# Patient Record
Sex: MALE | Race: WHITE | Employment: FULL TIME | URBAN - NONMETROPOLITAN AREA
[De-identification: names, ages, dates, MRNs, and addresses within clinical notes are randomized per-mention and may not be internally consistent; named-entity substitution may affect disease eponyms.]

---

## 2022-05-11 ENCOUNTER — APPOINTMENT (OUTPATIENT)
Dept: CT IMAGING | Age: 62
DRG: 065 | End: 2022-05-11
Attending: INTERNAL MEDICINE

## 2022-05-11 ENCOUNTER — HOSPITAL ENCOUNTER (INPATIENT)
Age: 62
LOS: 2 days | Discharge: HOME | DRG: 065 | End: 2022-05-13
Attending: INTERNAL MEDICINE | Admitting: INTERNAL MEDICINE

## 2022-05-11 ENCOUNTER — APPOINTMENT (OUTPATIENT)
Dept: CT IMAGING | Age: 62
DRG: 065 | End: 2022-05-11
Attending: NURSE PRACTITIONER

## 2022-05-11 ENCOUNTER — APPOINTMENT (OUTPATIENT)
Dept: GENERAL RADIOLOGY | Age: 62
DRG: 065 | End: 2022-05-11
Attending: INTERNAL MEDICINE

## 2022-05-11 DIAGNOSIS — I63.9 CEREBROVASCULAR ACCIDENT (CVA), UNSPECIFIED MECHANISM (HCC): Primary | ICD-10-CM

## 2022-05-11 PROBLEM — I10 HYPERTENSION: Status: ACTIVE | Noted: 2022-05-11

## 2022-05-11 PROBLEM — Z72.0 TOBACCO ABUSE: Status: ACTIVE | Noted: 2022-05-11

## 2022-05-11 LAB
ALBUMIN SERPL-MCNC: 3.7 G/DL (ref 3.4–5)
ALBUMIN/GLOB SERPL: 0.9 {RATIO} (ref 0.8–1.7)
ALP SERPL-CCNC: 82 U/L (ref 45–117)
ALT SERPL-CCNC: 23 U/L (ref 16–61)
ANION GAP SERPL CALC-SCNC: 3 MMOL/L (ref 3–18)
AST SERPL W P-5'-P-CCNC: 16 U/L (ref 10–38)
ATRIAL RATE: 87 BPM
BASOPHILS # BLD: 0.1 K/UL (ref 0–0.1)
BASOPHILS NFR BLD: 1 % (ref 0–2)
BILIRUB DIRECT SERPL-MCNC: <0.1 MG/DL (ref 0–0.2)
BILIRUB SERPL-MCNC: 0.3 MG/DL (ref 0.2–1)
BUN SERPL-MCNC: 14 MG/DL (ref 7–18)
BUN/CREAT SERPL: 17 (ref 12–20)
CA-I BLD-MCNC: 9.2 MG/DL (ref 8.5–10.1)
CALCULATED P AXIS, ECG09: 57 DEGREES
CALCULATED R AXIS, ECG10: -14 DEGREES
CALCULATED T AXIS, ECG11: 131 DEGREES
CHLORIDE SERPL-SCNC: 104 MMOL/L (ref 100–111)
CO2 SERPL-SCNC: 30 MMOL/L (ref 21–32)
CREAT SERPL-MCNC: 0.82 MG/DL (ref 0.6–1.3)
DIAGNOSIS, 93000: NORMAL
DIFFERENTIAL METHOD BLD: ABNORMAL
EOSINOPHIL # BLD: 0.2 K/UL (ref 0–0.4)
EOSINOPHIL NFR BLD: 3 % (ref 0–5)
ERYTHROCYTE [DISTWIDTH] IN BLOOD BY AUTOMATED COUNT: 13.8 % (ref 11.6–14.5)
GLOBULIN SER CALC-MCNC: 4.1 G/DL (ref 2–4)
GLUCOSE SERPL-MCNC: 94 MG/DL (ref 74–99)
HCT VFR BLD AUTO: 50.2 % (ref 36–48)
HGB BLD-MCNC: 16.2 G/DL (ref 13–16)
IMM GRANULOCYTES # BLD AUTO: 0 K/UL (ref 0–0.04)
IMM GRANULOCYTES NFR BLD AUTO: 0 % (ref 0–0.5)
LYMPHOCYTES # BLD: 2.4 K/UL (ref 0.9–3.6)
LYMPHOCYTES NFR BLD: 37 % (ref 21–52)
MCH RBC QN AUTO: 29 PG (ref 24–34)
MCHC RBC AUTO-ENTMCNC: 32.3 G/DL (ref 31–37)
MCV RBC AUTO: 89.8 FL (ref 78–100)
MONOCYTES # BLD: 0.4 K/UL (ref 0.05–1.2)
MONOCYTES NFR BLD: 7 % (ref 3–10)
NEUTS SEG # BLD: 3.3 K/UL (ref 1.8–8)
NEUTS SEG NFR BLD: 52 % (ref 40–73)
NRBC # BLD: 0 K/UL (ref 0–0.01)
NRBC BLD-RTO: 0 PER 100 WBC
P-R INTERVAL, ECG05: 154 MS
PLATELET # BLD AUTO: 203 K/UL (ref 135–420)
PMV BLD AUTO: 11.2 FL (ref 9.2–11.8)
POTASSIUM SERPL-SCNC: 3.8 MMOL/L (ref 3.5–5.5)
PROT SERPL-MCNC: 7.8 G/DL (ref 6.4–8.2)
Q-T INTERVAL, ECG07: 382 MS
QRS DURATION, ECG06: 89 MS
QTC CALCULATION (BEZET), ECG08: 463 MS
RBC # BLD AUTO: 5.59 M/UL (ref 4.35–5.65)
SODIUM SERPL-SCNC: 137 MMOL/L (ref 136–145)
TROPONIN-HIGH SENSITIVITY: 34 NG/L (ref 0–78)
VENTRICULAR RATE, ECG03: 88 BPM
WBC # BLD AUTO: 6.4 K/UL (ref 4.6–13.2)

## 2022-05-11 PROCEDURE — 85025 COMPLETE CBC W/AUTO DIFF WBC: CPT

## 2022-05-11 PROCEDURE — 93005 ELECTROCARDIOGRAM TRACING: CPT

## 2022-05-11 PROCEDURE — 74011250636 HC RX REV CODE- 250/636: Performed by: INTERNAL MEDICINE

## 2022-05-11 PROCEDURE — 84443 ASSAY THYROID STIM HORMONE: CPT

## 2022-05-11 PROCEDURE — 80048 BASIC METABOLIC PNL TOTAL CA: CPT

## 2022-05-11 PROCEDURE — 80076 HEPATIC FUNCTION PANEL: CPT

## 2022-05-11 PROCEDURE — 70496 CT ANGIOGRAPHY HEAD: CPT

## 2022-05-11 PROCEDURE — 70450 CT HEAD/BRAIN W/O DYE: CPT

## 2022-05-11 PROCEDURE — 84484 ASSAY OF TROPONIN QUANT: CPT

## 2022-05-11 PROCEDURE — 74011250637 HC RX REV CODE- 250/637: Performed by: INTERNAL MEDICINE

## 2022-05-11 PROCEDURE — 71045 X-RAY EXAM CHEST 1 VIEW: CPT

## 2022-05-11 PROCEDURE — 74011250636 HC RX REV CODE- 250/636: Performed by: NURSE PRACTITIONER

## 2022-05-11 PROCEDURE — 65270000029 HC RM PRIVATE

## 2022-05-11 PROCEDURE — 74011250637 HC RX REV CODE- 250/637: Performed by: NURSE PRACTITIONER

## 2022-05-11 PROCEDURE — 74011000636 HC RX REV CODE- 636: Performed by: INTERNAL MEDICINE

## 2022-05-11 PROCEDURE — 96374 THER/PROPH/DIAG INJ IV PUSH: CPT

## 2022-05-11 PROCEDURE — 99285 EMERGENCY DEPT VISIT HI MDM: CPT

## 2022-05-11 RX ORDER — IBUPROFEN 200 MG
1 TABLET ORAL DAILY
Status: DISCONTINUED | OUTPATIENT
Start: 2022-05-12 | End: 2022-05-13 | Stop reason: HOSPADM

## 2022-05-11 RX ORDER — LABETALOL HCL 20 MG/4 ML
10 SYRINGE (ML) INTRAVENOUS
Status: DISCONTINUED | OUTPATIENT
Start: 2022-05-11 | End: 2022-05-13 | Stop reason: HOSPADM

## 2022-05-11 RX ORDER — LABETALOL HCL 20 MG/4 ML
20 SYRINGE (ML) INTRAVENOUS ONCE
Status: COMPLETED | OUTPATIENT
Start: 2022-05-11 | End: 2022-05-11

## 2022-05-11 RX ORDER — ATORVASTATIN CALCIUM 40 MG/1
80 TABLET, FILM COATED ORAL
Status: DISCONTINUED | OUTPATIENT
Start: 2022-05-11 | End: 2022-05-13 | Stop reason: HOSPADM

## 2022-05-11 RX ORDER — ENOXAPARIN SODIUM 100 MG/ML
40 INJECTION SUBCUTANEOUS EVERY 24 HOURS
Status: DISCONTINUED | OUTPATIENT
Start: 2022-05-11 | End: 2022-05-11

## 2022-05-11 RX ORDER — LABETALOL HCL 20 MG/4 ML
5 SYRINGE (ML) INTRAVENOUS
Status: DISCONTINUED | OUTPATIENT
Start: 2022-05-11 | End: 2022-05-11

## 2022-05-11 RX ORDER — HYDRALAZINE HYDROCHLORIDE 20 MG/ML
10 INJECTION INTRAMUSCULAR; INTRAVENOUS
Status: DISCONTINUED | OUTPATIENT
Start: 2022-05-11 | End: 2022-05-11

## 2022-05-11 RX ORDER — ACETAMINOPHEN 325 MG/1
650 TABLET ORAL
Status: DISCONTINUED | OUTPATIENT
Start: 2022-05-11 | End: 2022-05-13 | Stop reason: HOSPADM

## 2022-05-11 RX ORDER — ENOXAPARIN SODIUM 100 MG/ML
40 INJECTION SUBCUTANEOUS EVERY 24 HOURS
Status: DISCONTINUED | OUTPATIENT
Start: 2022-05-12 | End: 2022-05-13 | Stop reason: HOSPADM

## 2022-05-11 RX ORDER — ONDANSETRON 4 MG/1
4 TABLET, ORALLY DISINTEGRATING ORAL
Status: DISCONTINUED | OUTPATIENT
Start: 2022-05-11 | End: 2022-05-13 | Stop reason: HOSPADM

## 2022-05-11 RX ORDER — ASPIRIN 325 MG
325 TABLET ORAL
Status: COMPLETED | OUTPATIENT
Start: 2022-05-11 | End: 2022-05-11

## 2022-05-11 RX ORDER — HYDRALAZINE HYDROCHLORIDE 20 MG/ML
10 INJECTION INTRAMUSCULAR; INTRAVENOUS
Status: DISCONTINUED | OUTPATIENT
Start: 2022-05-11 | End: 2022-05-13 | Stop reason: HOSPADM

## 2022-05-11 RX ORDER — GUAIFENESIN 100 MG/5ML
81 LIQUID (ML) ORAL DAILY
Status: DISCONTINUED | OUTPATIENT
Start: 2022-05-12 | End: 2022-05-13 | Stop reason: HOSPADM

## 2022-05-11 RX ADMIN — ATORVASTATIN CALCIUM 80 MG: 40 TABLET, FILM COATED ORAL at 21:01

## 2022-05-11 RX ADMIN — ASPIRIN 325 MG ORAL TABLET 325 MG: 325 PILL ORAL at 15:55

## 2022-05-11 RX ADMIN — LABETALOL HYDROCHLORIDE 10 MG: 5 INJECTION, SOLUTION INTRAVENOUS at 15:56

## 2022-05-11 RX ADMIN — HYDRALAZINE HYDROCHLORIDE 10 MG: 20 INJECTION INTRAMUSCULAR; INTRAVENOUS at 22:56

## 2022-05-11 RX ADMIN — IOPAMIDOL 100 ML: 755 INJECTION, SOLUTION INTRAVENOUS at 17:19

## 2022-05-11 NOTE — ED NOTES
TRANSFER - OUT REPORT:    Verbal report given to Kayla Nichols LPN(name) on Erwin Butcher  being transferred to [address] [place] [address](unit) for routine progression of care       Report consisted of patients Situation, Background, Assessment and   Recommendations(SBAR). Information from the following report(s) SBAR, ED Summary, MAR, Recent Results and Cardiac Rhythm NSR was reviewed with the receiving nurse. Lines:   Peripheral IV 05/11/22 Right Antecubital (Active)        Opportunity for questions and clarification was provided.       Patient transported with:   Monitor  Registered Nurse

## 2022-05-11 NOTE — PROGRESS NOTES
1900- Assumed care of pt. Family at bedside. Pt alert and oriented. No other needs at this time. 2105- Scheduled medications given per MAR. Pt tolerated well. Pt resting quietly watching tv. 2258- Vitals obtained. Gave pt hydralazine via IV for BP. MD aware. 0109- BP down slightly. Will continue to monitor. 0125- Scheduled medication given per MAR. Pt tolerated well. 0315- Pt rounded. Vitals obtained. BP improved with hydralazine.

## 2022-05-11 NOTE — ED TRIAGE NOTES
Pt. States he was at work yesterday morning around 1100 and he noticed left sided arm and leg weakness and slurred speech. Pt kept on working and got home at 0. Pt. Agreed to come to the ER this afternoon.

## 2022-05-11 NOTE — ASSESSMENT & PLAN NOTE
-symptoms: left side weakness and slurred speech  -CT head: No acute intracranial abnormality. Subcortical and periventricular white matter low-attenuation suggesting chronic ischemic microvascular change along with stigmata of several prior areas of infarction.   -CTA head/neck ordered  -MRI ordered  -allow permissive hypertension, treat with labetalol for systolic greater than 179/593 and Hydralazine for systolic greater than 028  -ASA 81 mg, Lipitor 80 mg started  -EKG: SR with LVH  -Neuro checks every 4 hours  -ECHO in am  -lipid panel in am  -Neuro consulted in the ED  -PT/OT evaluation

## 2022-05-11 NOTE — H&P
History and Physical    Subjective:     Andreina Hitchcock is a 63 y.o. with a past medical history for hypertension, coronary artery disease with a LAD stent, dyslipidemia, and tobacco abuse, patient presented to the ED with a chief complaint of left-sided weakness and slurred speech. Patient reports that the symptoms started yesterday afternoon around 1, and continued into today, at that time wife noticed that his symptoms of slurred speech had worsened, and encourage patient to come to the hospital. Other accompanying symptoms included ataxia and left-sided facial droop, patient denies lightheadedness, confusion, numbness and tingling, chest pain, palpitations, shortness of breath, and abdominal distress. In the ED H&H 16.2/50.2, BMP unremarkable, CT of the head showed no acute intracranial abnormalities, chest x-ray showed no acute findings, EKG shows sinus rhythm with probable left atrial enlargement. While in the ED patient received 325 mg of oral aspirin and 10 mg of IV labetalol for systolic blood pressure greater than 220. Admit to telemetry for CVA workup. Patient assessed in the ED, at the bedside, patient is alert and oriented, there is no acute distress noted. Patient agrees to admission for a diagnosis of CVA, treatment to include CTA pending, MRI ordered, echo, neurological checks every 4 hours, and telemetry neurologist consult in the ED. Past Medical History:   Diagnosis Date    Hypertension       Past Surgical History:   Procedure Laterality Date    HX HEART CATHETERIZATION      x1 stent     History reviewed. No pertinent family history.    Social History     Tobacco Use    Smoking status: Current Every Day Smoker     Packs/day: 1.00    Smokeless tobacco: Never Used   Substance Use Topics    Alcohol use: Not on file     Comment: occasionally       Prior to Admission medications    Not on File     No Known Allergies       REVIEW OF SYSTEMS:       Total of 12 systems reviewed as follows: Positive = Red  Constitutional: Negative for malaise/fatigue and weakness, negative for fever and chills   HENT: Negative for ear pain, headaches, negative for loss of sense of taste and smell   Eyes: Negative for blurred vision and double vision   Skin: Negative for itching, negative for open areas   Cardiovascular: Negative for chest pain, palpitations, negative for swelling   Respiratory: Negative for shortness or breath, negative for cough, negative for sputum production   Gastrointestinal: Negative for abdominal pain, constipation, nausea, vomiting, and diarrhea   Genitourinary: Negative for dysuria, frequency, and hematuria   Musculoskeletal: Negative for joint pain and myalgias   Neurological: Positive for left side weakness, left side facial droop, slurred speech, and balance issues. Negative for dizziness, seizures, and headaches   Psychiatric: Negative for depression and anxiousness       Objective:   VITALS:    Visit Vitals  BP (!) 228/144   Pulse 97   Temp 97.7 °F (36.5 °C)   Resp 16   Ht 5' 10\" (1.778 m)   Wt 74.8 kg (165 lb)   SpO2 99%   BMI 23.68 kg/m²       PHYSICAL EXAM:  Positive = Red  Constitutional: Alert and oriented x 3 and no noted acute distress appears to be stated age. HENT: Atraumatic, nose midline, oropharynx clear ad moist, trachea midline, no supraclavicular   Eyes: Conjunctiva normal and pupils equal   Skin: Dry, intact, warm, and dry   Cardiovascular: Regular rate and rhythm, normal heart sounds, no murmurs, pulses palpable, no noted edema   Respiratory: Lungs clear throughout, no wheezes, rales, or rhonchi, effort normal   Gastrointestinal: Appearance normal, bowel sounds are normal, bowl soft and non-tender   Genitourinary: Deferred   Musculoskeletal: Normal ROM   Neurological: Alert and oriented x 3, awake. posiitve for left facial droop, slurred speech, and left side upper and lower extremity weakness.  Intact sensations   Psychiatric: Affect normal, Answers questions appropriately     __________________________________________________  Care Plan discussed with:    Comments   Patient X    Family      RN     Care Manager                    Consultant:      _______________________________________________________________________  Expected  Disposition:   Home with Family X   HH/PT/OT/RN    SNF/LTC    [facility]    ________________________________________________________________________    Labs:  Recent Results (from the past 24 hour(s))   CBC WITH AUTOMATED DIFF    Collection Time: 05/11/22  2:28 PM   Result Value Ref Range    WBC 6.4 4.6 - 13.2 K/uL    RBC 5.59 4.35 - 5.65 M/uL    HGB 16.2 (H) 13.0 - 16.0 g/dL    HCT 50.2 (H) 36.0 - 48.0 %    MCV 89.8 78.0 - 100.0 FL    MCH 29.0 24.0 - 34.0 PG    MCHC 32.3 31.0 - 37.0 g/dL    RDW 13.8 11.6 - 14.5 %    PLATELET 326 672 - 435 K/uL    MPV 11.2 9.2 - 11.8 FL    NRBC 0.0 0.0  WBC    ABSOLUTE NRBC 0.00 0.00 - 0.01 K/uL    NEUTROPHILS 52 40 - 73 %    LYMPHOCYTES 37 21 - 52 %    MONOCYTES 7 3 - 10 %    EOSINOPHILS 3 0 - 5 %    BASOPHILS 1 0 - 2 %    IMMATURE GRANULOCYTES 0 0 - 0.5 %    ABS. NEUTROPHILS 3.3 1.8 - 8.0 K/UL    ABS. LYMPHOCYTES 2.4 0.9 - 3.6 K/UL    ABS. MONOCYTES 0.4 0.05 - 1.2 K/UL    ABS. EOSINOPHILS 0.2 0.0 - 0.4 K/UL    ABS. BASOPHILS 0.1 0.0 - 0.1 K/UL    ABS. IMM.  GRANS. 0.0 0.00 - 0.04 K/UL    DF AUTOMATED     METABOLIC PANEL, BASIC    Collection Time: 05/11/22  2:28 PM   Result Value Ref Range    Sodium 137 136 - 145 mmol/L    Potassium 3.8 3.5 - 5.5 mmol/L    Chloride 104 100 - 111 mmol/L    CO2 30 21 - 32 mmol/L    Anion gap 3 3.0 - 18.0 mmol/L    Glucose 94 74 - 99 mg/dL    BUN 14 7 - 18 mg/dL    Creatinine 0.82 0.60 - 1.30 mg/dL    BUN/Creatinine ratio 17 12 - 20      GFR est AA >60 >60 ml/min/1.73m2    GFR est non-AA >60 >60 ml/min/1.73m2    Calcium 9.2 8.5 - 10.1 mg/dL   TROPONIN-HIGH SENSITIVITY    Collection Time: 05/11/22  2:28 PM   Result Value Ref Range    Troponin-High Sensitivity 34 0 - 78 ng/L   HEPATIC FUNCTION PANEL    Collection Time: 05/11/22  2:28 PM   Result Value Ref Range    Protein, total 7.8 6.4 - 8.2 g/dL    Albumin 3.7 3.4 - 5.0 g/dL    Globulin 4.1 (H) 2.0 - 4.0 g/dL    A-G Ratio 0.9 0.8 - 1.7      Bilirubin, total 0.3 0.2 - 1.0 mg/dL    Bilirubin, direct <0.1 0.0 - 0.2 mg/dL    Alk. phosphatase 82 45 - 117 U/L    AST (SGOT) 16 10 - 38 U/L    ALT (SGPT) 23 16 - 61 U/L   EKG, 12 LEAD, INITIAL    Collection Time: 05/11/22  3:40 PM   Result Value Ref Range    Ventricular Rate 88 BPM    Atrial Rate 87 BPM    P-R Interval 154 ms    QRS Duration 89 ms    Q-T Interval 382 ms    QTC Calculation (Bezet) 463 ms    Calculated P Axis 57 degrees    Calculated R Axis -14 degrees    Calculated T Axis 131 degrees    Diagnosis       Sinus rhythm  Probable left atrial enlargement  LVH with secondary repolarization abnormality    Confirmed by AYDEE Thomas ([id number]) on 5/11/2022 4:55:55 PM         Imaging:  CT HEAD WO CONT    Result Date: 5/11/2022  EXAM: CT head INDICATION: Slurred speech right-sided facial droop COMPARISON: None. TECHNIQUE: Axial CT imaging of the head was performed without intravenous contrast. Standard multiplanar coronal and sagittal reformatted images were obtained and are included in interpretation. One or more dose reduction techniques were used on this CT: automated exposure control, adjustment of the mAs and/or kVp according to patient size, and iterative reconstruction techniques. The specific techniques used on this CT exam have been documented in the patient's electronic medical record. Digital Imaging and Communications in Medicine (DICOM) format image data are available to nonaffiliated external healthcare facilities or entities on a secure, media free, reciprocally searchable basis with patient authorization for at least a 12-month period after this study. _______________ FINDINGS: BRAIN AND POSTERIOR FOSSA: Mild cortical and cerebellar volume loss noted.  The ventricular size and configuration is remarkable for mild ex vacuo prominence of the anterior horn of the left lateral ventricle with adjacent caudate head lacunar infarction. Several additional punctate lacunar areas of hypodensity are noted throughout the basal ganglia bilaterally. Moderate subcortical and periventricular white matter low-attenuation is noted. Focal area of encephalomalacia anterior right frontal lobe. There is no intracranial hemorrhage, mass effect, or midline shift. Gray-white matter differentiation is within normal limits. EXTRA-AXIAL SPACES AND MENINGES: There are no abnormal extra-axial fluid collections. CALVARIUM: Intact. SINUSES: Mild and diffuse mucosal thickening of both ethmoid air cells and maxillary sinuses. OTHER: None. _______________     1. No acute intracranial abnormality. 2. Subcortical and periventricular white matter low-attenuation suggesting chronic ischemic microvascular change along with stigmata of several prior areas of infarction. XR CHEST PORT    Result Date: 5/11/2022  EXAM: XR CHEST PORT CLINICAL INDICATION/HISTORY: stroke -Additional: None COMPARISON: None TECHNIQUE: Frontal view of the chest _______________ FINDINGS: HEART AND MEDIASTINUM: Normal cardiac size and mediastinal contours. LUNGS AND PLEURAL SPACES: No focal pneumonic consolidation, pneumothorax, or pleural effusion. BONY THORAX AND SOFT TISSUES: No acute osseous abnormality _______________     No acute findings in the chest.        Assessment & Plan:     Stroke [facility])  -symptoms: left side weakness and slurred speech  -CT head: No acute intracranial abnormality. Subcortical and periventricular white matter low-attenuation suggesting chronic ischemic microvascular change along with stigmata of several prior areas of infarction.   -CTA head/neck ordered  -MRI ordered  -allow permissive hypertension, treat with labetalol for systolic greater than 338/573 and Hydralazine for systolic greater than 879  -ASA 81 mg, Lipitor 80 mg started  -EKG: SR with LVH  -Neuro checks every 4 hours  -ECHO in am  -lipid panel in am  -Neuro consulted in the ED  -PT/OT evaluation    Hypertension  -chronic/uncontrolled,  -(allowing permissive Hypertension), treating with labetalol for systolic greater than 978/020 and Hydralazine for systolic greater than 433  -monitor BP closely    History of CAD with LAD stent  -continue ASA, start statin    Hyperlipidemia  -per patient currently not on a statin  -started statin, lipid panel pending    Tobacco abuse  -nicotine patch offerred  -smoking cessation education    TOTAL TIME:  45 Minutes    Code Status: Full    Prophylaxis:  Lovenox    Electronically Signed : ROSEMARY Garcia-BC [address]    Please note that this dictation was completed with [employer], the computer voice recognition software. Quite often unanticipated grammatical, syntax, homophones, and other interpretive errors are inadvertently transcribed by the computer software. Please disregard these errors. Please excuse any errors that have escaped final proofreading. Thank you.

## 2022-05-11 NOTE — PROGRESS NOTES
1800- Received care of pt from ED. Ambulated from stretcher to bed. Assessment completed with PHILIPPE Gill. Wife made aware of room. NP made aware. Tele 2s13. Dinner tray given. Call bell within reach. 1822- Pt coughing, nursing staff to bedside. Pt stated \"milk went down the wrong way. \" Nursing staff did a repeat swallow study at bedside with water and pt tolerated well.

## 2022-05-11 NOTE — ED PROVIDER NOTES
EMERGENCY DEPARTMENT HISTORY AND PHYSICAL EXAM      Date: 5/11/2022  Patient Name: Dane Lo      History of Presenting Illness     Chief Complaint   Patient presents with    Facial Droop    Extremity Weakness       History Provided By: Patient    HPI: Dane Lo, 63 y.o. male with a past medical history significant CAD w stent; HTN that presents to the ED with cc of  Left sided weakness and slurred speech since yesterday morning. +smoker. Was in [place] at the time and waiting until he came home today to come to the hospital.  Denies HA; n/v; fever/ chills. There are no other complaints, changes, or physical findings at this time. PCP: None        Past History     Past Medical History:  Past Medical History:   Diagnosis Date    Hypertension        Past Surgical History:  Past Surgical History:   Procedure Laterality Date    HX HEART CATHETERIZATION      x1 stent       Family History:  History reviewed. No pertinent family history. Social History:  Social History     Tobacco Use    Smoking status: Current Every Day Smoker     Packs/day: 1.00    Smokeless tobacco: Never Used   Substance Use Topics    Alcohol use: Not on file     Comment: occasionally    Drug use: Not Currently       Allergies:  No Known Allergies      Review of Systems     Review of Systems   Constitutional: Negative for chills and fever. HENT: Negative for trouble swallowing. Eyes: Negative for visual disturbance. Respiratory: Negative for cough and shortness of breath. Cardiovascular: Negative for chest pain and palpitations. Gastrointestinal: Negative for abdominal pain, diarrhea, nausea and vomiting. Genitourinary: Negative for flank pain. Musculoskeletal: Negative for arthralgias and neck stiffness. Neurological: Positive for facial asymmetry and weakness. Psychiatric/Behavioral: Negative for confusion. Physical Exam     Physical Exam  Vitals and nursing note reviewed.    Constitutional:       General: He is not in acute distress. Appearance: Normal appearance. He is not ill-appearing or diaphoretic. HENT:      Head: Normocephalic. Mouth/Throat:      Pharynx: Oropharynx is clear. Eyes:      Extraocular Movements: Extraocular movements intact. Conjunctiva/sclera: Conjunctivae normal.      Pupils: Pupils are equal, round, and reactive to light. Cardiovascular:      Rate and Rhythm: Regular rhythm. Heart sounds: Normal heart sounds. No murmur heard. Pulmonary:      Effort: No respiratory distress. Breath sounds: Normal breath sounds. No wheezing or rales. Abdominal:      General: There is no distension. Palpations: Abdomen is soft. Tenderness: There is no abdominal tenderness. There is no guarding. Musculoskeletal:      Cervical back: Neck supple. Right lower leg: No edema. Left lower leg: No edema. Skin:     General: Skin is warm and dry. Neurological:      Mental Status: He is alert and oriented to person, place, and time. Comments: GCS 15; see NIH   Psychiatric:         Behavior: Behavior normal.         Lab and Diagnostic Study Results     Labs -     Recent Results (from the past 12 hour(s))   CBC WITH AUTOMATED DIFF    Collection Time: 05/11/22  2:28 PM   Result Value Ref Range    WBC 6.4 4.6 - 13.2 K/uL    RBC 5.59 4.35 - 5.65 M/uL    HGB 16.2 (H) 13.0 - 16.0 g/dL    HCT 50.2 (H) 36.0 - 48.0 %    MCV 89.8 78.0 - 100.0 FL    MCH 29.0 24.0 - 34.0 PG    MCHC 32.3 31.0 - 37.0 g/dL    RDW 13.8 11.6 - 14.5 %    PLATELET 671 069 - 188 K/uL    MPV 11.2 9.2 - 11.8 FL    NRBC 0.0 0.0  WBC    ABSOLUTE NRBC 0.00 0.00 - 0.01 K/uL    NEUTROPHILS 52 40 - 73 %    LYMPHOCYTES 37 21 - 52 %    MONOCYTES 7 3 - 10 %    EOSINOPHILS 3 0 - 5 %    BASOPHILS 1 0 - 2 %    IMMATURE GRANULOCYTES 0 0 - 0.5 %    ABS. NEUTROPHILS 3.3 1.8 - 8.0 K/UL    ABS. LYMPHOCYTES 2.4 0.9 - 3.6 K/UL    ABS. MONOCYTES 0.4 0.05 - 1.2 K/UL    ABS.  EOSINOPHILS 0.2 0.0 - 0.4 K/UL ABS. BASOPHILS 0.1 0.0 - 0.1 K/UL    ABS. IMM. GRANS. 0.0 0.00 - 0.04 K/UL    DF AUTOMATED     METABOLIC PANEL, BASIC    Collection Time: 05/11/22  2:28 PM   Result Value Ref Range    Sodium 137 136 - 145 mmol/L    Potassium 3.8 3.5 - 5.5 mmol/L    Chloride 104 100 - 111 mmol/L    CO2 30 21 - 32 mmol/L    Anion gap 3 3.0 - 18.0 mmol/L    Glucose 94 74 - 99 mg/dL    BUN 14 7 - 18 mg/dL    Creatinine 0.82 0.60 - 1.30 mg/dL    BUN/Creatinine ratio 17 12 - 20      GFR est AA >60 >60 ml/min/1.73m2    GFR est non-AA >60 >60 ml/min/1.73m2    Calcium 9.2 8.5 - 10.1 mg/dL   TROPONIN-HIGH SENSITIVITY    Collection Time: 05/11/22  2:28 PM   Result Value Ref Range    Troponin-High Sensitivity 34 0 - 78 ng/L   HEPATIC FUNCTION PANEL    Collection Time: 05/11/22  2:28 PM   Result Value Ref Range    Protein, total 7.8 6.4 - 8.2 g/dL    Albumin 3.7 3.4 - 5.0 g/dL    Globulin 4.1 (H) 2.0 - 4.0 g/dL    A-G Ratio 0.9 0.8 - 1.7      Bilirubin, total 0.3 0.2 - 1.0 mg/dL    Bilirubin, direct <0.1 0.0 - 0.2 mg/dL    Alk. phosphatase 82 45 - 117 U/L    AST (SGOT) 16 10 - 38 U/L    ALT (SGPT) 23 16 - 61 U/L   EKG, 12 LEAD, INITIAL    Collection Time: 05/11/22  3:40 PM   Result Value Ref Range    Ventricular Rate 88 BPM    Atrial Rate 87 BPM    P-R Interval 154 ms    QRS Duration 89 ms    Q-T Interval 382 ms    QTC Calculation (Bezet) 463 ms    Calculated P Axis 57 degrees    Calculated R Axis -14 degrees    Calculated T Axis 131 degrees    Diagnosis       Sinus rhythm  Probable left atrial enlargement  LVH with secondary repolarization abnormality         Radiologic Studies -   [unfilled]  CT Results  (Last 48 hours)               05/11/22 1535  CT HEAD WO CONT Final result    Impression:          1. No acute intracranial abnormality. 2. Subcortical and periventricular white matter low-attenuation suggesting   chronic ischemic microvascular change along with stigmata of several prior areas   of infarction.        Narrative:  EXAM: CT head       INDICATION: Slurred speech right-sided facial droop       COMPARISON: None. TECHNIQUE: Axial CT imaging of the head was performed without intravenous   contrast. Standard multiplanar coronal and sagittal reformatted images were   obtained and are included in interpretation. One or more dose reduction techniques were used on this CT: automated exposure   control, adjustment of the mAs and/or kVp according to patient size, and   iterative reconstruction techniques. The specific techniques used on this CT   exam have been documented in the patient's electronic medical record. Digital   Imaging and Communications in Medicine (DICOM) format image data are available   to nonaffiliated external healthcare facilities or entities on a secure, media   free, reciprocally searchable basis with patient authorization for at least a   12-month period after this study. _______________       FINDINGS:       BRAIN AND POSTERIOR FOSSA: Mild cortical and cerebellar volume loss noted. The   ventricular size and configuration is remarkable for mild ex vacuo prominence of   the anterior horn of the left lateral ventricle with adjacent caudate head   lacunar infarction. Several additional punctate lacunar areas of hypodensity are   noted throughout the basal ganglia bilaterally. Moderate subcortical and   periventricular white matter low-attenuation is noted. Focal area of   encephalomalacia anterior right frontal lobe. There is no intracranial   hemorrhage, mass effect, or midline shift. Gray-white matter differentiation is   within normal limits. EXTRA-AXIAL SPACES AND MENINGES: There are no abnormal extra-axial fluid   collections. CALVARIUM: Intact. SINUSES: Mild and diffuse mucosal thickening of both ethmoid air cells and   maxillary sinuses.        OTHER: None.       _______________               CXR Results  (Last 48 hours)               05/11/22 1542  XR CHEST PORT Final result Impression:      No acute findings in the chest.        Narrative:  EXAM: XR CHEST PORT       CLINICAL INDICATION/HISTORY: stroke   -Additional: None       COMPARISON: None       TECHNIQUE: Frontal view of the chest       _______________       FINDINGS:       HEART AND MEDIASTINUM: Normal cardiac size and mediastinal contours. LUNGS AND PLEURAL SPACES: No focal pneumonic consolidation, pneumothorax, or   pleural effusion. BONY THORAX AND SOFT TISSUES: No acute osseous abnormality       _______________                 Medical Decision Making and ED Course   - I am the first and primary provider for this patient AND AM THE PRIMARY PROVIDER OF RECORD. - I reviewed the vital signs, available nursing notes, past medical history, past surgical history, family history and social history. - Initial assessment performed. The patients presenting problems have been discussed, and the staff are in agreement with the care plan formulated and outlined with them. I have encouraged them to ask questions as they arise throughout their visit. Vital Signs-Reviewed the patient's vital signs. Patient Vitals for the past 12 hrs:   Temp Pulse Resp BP SpO2   05/11/22 1556 -- 97 -- (!) 228/144 --   05/11/22 1402 97.7 °F (36.5 °C) 89 16 (!) 225/138 99 %       EKG interpretation: (Preliminary): Performed at       Records Reviewed: Nursing Notes    ED Course:   4:42 PM  Case discussed with CHRISTY Morrison; will admit to tele      Consultations:       Consultations:   Seen by Mily Morgan; Dr Candice Kennedy    Procedures and Critical Care       Disposition     Disposition:  Admitted    Remove if not discharged  DISCHARGE PLAN:  1. There are no discharge medications for this patient. 2.   Follow-up Information    None       3. Return to ED if worse   4. There are no discharge medications for this patient. Diagnosis     Clinical Impression:   1.  Cerebrovascular accident (CVA), unspecified mechanism ([address] Attestations:    Nneka Rebolledo MD    Please note that this dictation was completed with [employer], the computer voice recognition software. Quite often unanticipated grammatical, syntax, homophones, and other interpretive errors are inadvertently transcribed by the computer software. Please disregard these errors. Please excuse any errors that have escaped final proofreading. Thank you.

## 2022-05-12 ENCOUNTER — APPOINTMENT (OUTPATIENT)
Dept: NON INVASIVE DIAGNOSTICS | Age: 62
DRG: 065 | End: 2022-05-12
Attending: NURSE PRACTITIONER

## 2022-05-12 PROBLEM — I63.9 STROKE (HCC): Status: RESOLVED | Noted: 2022-05-11 | Resolved: 2022-05-12

## 2022-05-12 LAB
ANION GAP SERPL CALC-SCNC: 6 MMOL/L (ref 3–18)
BUN SERPL-MCNC: 14 MG/DL (ref 7–18)
BUN/CREAT SERPL: 16 (ref 12–20)
CA-I BLD-MCNC: 9.5 MG/DL (ref 8.5–10.1)
CHLORIDE SERPL-SCNC: 104 MMOL/L (ref 100–111)
CHOLEST SERPL-MCNC: 167 MG/DL
CO2 SERPL-SCNC: 27 MMOL/L (ref 21–32)
CREAT SERPL-MCNC: 0.87 MG/DL (ref 0.6–1.3)
ECHO AO ASC DIAM: 3.7 CM
ECHO AO ASCENDING AORTA INDEX: 1.93 CM/M2
ECHO AO ROOT DIAM: 3.5 CM
ECHO AO ROOT INDEX: 1.82 CM/M2
ECHO AR MAX VEL PISA: 5.7 M/S
ECHO AV AREA PEAK VELOCITY: 2.9 CM2
ECHO AV AREA VTI: 2.6 CM2
ECHO AV AREA/BSA PEAK VELOCITY: 1.5 CM2/M2
ECHO AV AREA/BSA VTI: 1.4 CM2/M2
ECHO AV MEAN GRADIENT: 4 MMHG
ECHO AV MEAN VELOCITY: 0.9 M/S
ECHO AV PEAK GRADIENT: 5 MMHG
ECHO AV PEAK VELOCITY: 1.1 M/S
ECHO AV REGURGITANT PHT: 507 MS
ECHO AV VELOCITY RATIO: 0.91
ECHO AV VTI: 22.8 CM
ECHO EST RA PRESSURE: 3 MMHG
ECHO IVC PROX: 1.6 CM
ECHO LA AREA 2C: 18.5 CM2
ECHO LA AREA 4C: 16.9 CM2
ECHO LA DIAMETER INDEX: 1.98 CM/M2
ECHO LA DIAMETER: 3.8 CM
ECHO LA MAJOR AXIS: 4.7 CM
ECHO LA MINOR AXIS: 5.3 CM
ECHO LA TO AORTIC ROOT RATIO: 1.09
ECHO LA VOL BP: 52 ML (ref 18–58)
ECHO LA VOL/BSA BIPLANE: 27 ML/M2 (ref 16–34)
ECHO LV E' LATERAL VELOCITY: 3 CM/S
ECHO LV E' SEPTAL VELOCITY: 2 CM/S
ECHO LV EDV A2C: 48 ML
ECHO LV EDV A4C: 55 ML
ECHO LV EDV INDEX A4C: 29 ML/M2
ECHO LV EDV NDEX A2C: 25 ML/M2
ECHO LV EJECTION FRACTION A2C: 62 %
ECHO LV EJECTION FRACTION A4C: 62 %
ECHO LV EJECTION FRACTION BIPLANE: 62 % (ref 55–100)
ECHO LV ESV A2C: 18 ML
ECHO LV ESV A4C: 21 ML
ECHO LV ESV INDEX A2C: 9 ML/M2
ECHO LV ESV INDEX A4C: 11 ML/M2
ECHO LV FRACTIONAL SHORTENING: 33 % (ref 28–44)
ECHO LV INTERNAL DIMENSION DIASTOLE INDEX: 2.24 CM/M2
ECHO LV INTERNAL DIMENSION DIASTOLIC: 4.3 CM (ref 4.2–5.9)
ECHO LV INTERNAL DIMENSION SYSTOLIC INDEX: 1.51 CM/M2
ECHO LV INTERNAL DIMENSION SYSTOLIC: 2.9 CM
ECHO LV IVSD: 1.9 CM (ref 0.6–1)
ECHO LV MASS 2D: 329.3 G (ref 88–224)
ECHO LV MASS INDEX 2D: 171.5 G/M2 (ref 49–115)
ECHO LV POSTERIOR WALL DIASTOLIC: 1.6 CM (ref 0.6–1)
ECHO LV RELATIVE WALL THICKNESS RATIO: 0.74
ECHO LVOT AREA: 3.5 CM2
ECHO LVOT AV VTI INDEX: 0.75
ECHO LVOT DIAM: 2.1 CM
ECHO LVOT MEAN GRADIENT: 2 MMHG
ECHO LVOT PEAK GRADIENT: 4 MMHG
ECHO LVOT PEAK VELOCITY: 1 M/S
ECHO LVOT STROKE VOLUME INDEX: 30.8 ML/M2
ECHO LVOT SV: 59.2 ML
ECHO LVOT VTI: 17.1 CM
ECHO MV A VELOCITY: 0.99 M/S
ECHO MV AREA VTI: 2.9 CM2
ECHO MV E DECELERATION TIME (DT): 209 MS
ECHO MV E VELOCITY: 0.56 M/S
ECHO MV E/A RATIO: 0.57
ECHO MV E/E' LATERAL: 18.67
ECHO MV E/E' RATIO (AVERAGED): 23.33
ECHO MV E/E' SEPTAL: 28
ECHO MV LVOT VTI INDEX: 1.18
ECHO MV MAX VELOCITY: 1.2 M/S
ECHO MV MEAN GRADIENT: 3 MMHG
ECHO MV MEAN VELOCITY: 0.8 M/S
ECHO MV PEAK GRADIENT: 5 MMHG
ECHO MV VTI: 20.2 CM
ECHO PV MAX VELOCITY: 0.8 M/S
ECHO PV PEAK GRADIENT: 3 MMHG
ECHO RA AREA 4C: 13.6 CM2
ECHO RIGHT VENTRICULAR SYSTOLIC PRESSURE (RVSP): 23 MMHG
ECHO RV BASAL DIMENSION: 3.2 CM
ECHO RV LONGITUDINAL DIMENSION: 5.7 CM
ECHO RV MID DIMENSION: 2 CM
ECHO RV TAPSE: 1.9 CM (ref 1.7–?)
ECHO TV REGURGITANT MAX VELOCITY: 2.24 M/S
ECHO TV REGURGITANT PEAK GRADIENT: 20 MMHG
ERYTHROCYTE [DISTWIDTH] IN BLOOD BY AUTOMATED COUNT: 13.8 % (ref 11.6–14.5)
EST. AVERAGE GLUCOSE BLD GHB EST-MCNC: 117 MG/DL
GLUCOSE SERPL-MCNC: 96 MG/DL (ref 74–99)
HBA1C MFR BLD: 5.7 % (ref 4.2–5.6)
HCT VFR BLD AUTO: 51.7 % (ref 36–48)
HDLC SERPL-MCNC: 57 MG/DL (ref 40–60)
HDLC SERPL: 2.9 {RATIO} (ref 0–5)
HGB BLD-MCNC: 16.8 G/DL (ref 13–16)
LDLC SERPL CALC-MCNC: 97 MG/DL (ref 0–100)
LIPID PROFILE,FLP: NORMAL
MCH RBC QN AUTO: 29.3 PG (ref 24–34)
MCHC RBC AUTO-ENTMCNC: 32.5 G/DL (ref 31–37)
MCV RBC AUTO: 90.1 FL (ref 78–100)
NRBC # BLD: 0 K/UL (ref 0–0.01)
NRBC BLD-RTO: 0 PER 100 WBC
PLATELET # BLD AUTO: 211 K/UL (ref 135–420)
PMV BLD AUTO: 11.3 FL (ref 9.2–11.8)
POTASSIUM SERPL-SCNC: 3.4 MMOL/L (ref 3.5–5.5)
RBC # BLD AUTO: 5.74 M/UL (ref 4.35–5.65)
SODIUM SERPL-SCNC: 137 MMOL/L (ref 136–145)
TRIGL SERPL-MCNC: 65 MG/DL (ref ?–150)
TSH SERPL DL<=0.05 MIU/L-ACNC: 2.15 UIU/ML (ref 0.36–3.74)
VLDLC SERPL CALC-MCNC: 13 MG/DL
WBC # BLD AUTO: 6.2 K/UL (ref 4.6–13.2)

## 2022-05-12 PROCEDURE — 74011250636 HC RX REV CODE- 250/636: Performed by: NURSE PRACTITIONER

## 2022-05-12 PROCEDURE — 80061 LIPID PANEL: CPT

## 2022-05-12 PROCEDURE — 83036 HEMOGLOBIN GLYCOSYLATED A1C: CPT

## 2022-05-12 PROCEDURE — 36415 COLL VENOUS BLD VENIPUNCTURE: CPT

## 2022-05-12 PROCEDURE — 92610 EVALUATE SWALLOWING FUNCTION: CPT

## 2022-05-12 PROCEDURE — 85027 COMPLETE CBC AUTOMATED: CPT

## 2022-05-12 PROCEDURE — 65270000029 HC RM PRIVATE

## 2022-05-12 PROCEDURE — 93306 TTE W/DOPPLER COMPLETE: CPT

## 2022-05-12 PROCEDURE — 74011250637 HC RX REV CODE- 250/637: Performed by: NURSE PRACTITIONER

## 2022-05-12 PROCEDURE — 97161 PT EVAL LOW COMPLEX 20 MIN: CPT

## 2022-05-12 PROCEDURE — 74011250636 HC RX REV CODE- 250/636: Performed by: INTERNAL MEDICINE

## 2022-05-12 PROCEDURE — 97165 OT EVAL LOW COMPLEX 30 MIN: CPT

## 2022-05-12 RX ADMIN — ATORVASTATIN CALCIUM 80 MG: 40 TABLET, FILM COATED ORAL at 21:10

## 2022-05-12 RX ADMIN — ASPIRIN 81 MG: 81 TABLET, CHEWABLE ORAL at 08:36

## 2022-05-12 RX ADMIN — ENOXAPARIN SODIUM 40 MG: 100 INJECTION SUBCUTANEOUS at 01:25

## 2022-05-12 RX ADMIN — LABETALOL HYDROCHLORIDE 10 MG: 5 INJECTION, SOLUTION INTRAVENOUS at 18:06

## 2022-05-12 NOTE — PROGRESS NOTES
Comprehensive Nutrition Assessment    Type and Reason for Visit: Initial    Nutrition Recommendations/Plan:   1. Cardiac diet     Malnutrition Assessment:  Malnutrition Status:  No malnutrition (05/12/22 1623)    Context:  Acute illness     Findings of the 6 clinical characteristics of malnutrition:   Energy Intake:  No significant decrease in energy intake  Weight Loss:  No significant weight loss     Body Fat Loss:  No significant body fat loss,     Muscle Mass Loss:  No significant muscle mass loss,    Fluid Accumulation:  No significant fluid accumulation,     Strength:  Normal  strength         Nutrition Assessment:    64 yo male PMH: HTN, CAD, dyslipidemia    Nutrition Related Findings:    normal weight BMI 23.4. Pt presents with left sided weakness and slurred speech with facial droop. Hgb A1c 5.7 K+ 3.4 Pt seen by S/T no issues chewing or swallowing safe for regular texture thin liquids. Started on Cardiac diet. Wound Type: None   Provided handout via nutrition care manual for stroke nutrition therapy encourage lifestyle changes to manage HTN and educated high K+ foods to incorporate in diet. Pt reports he has no questions and accepts education.      Recent Results (from the past 24 hour(s))   LIPID PANEL    Collection Time: 05/12/22  4:15 AM   Result Value Ref Range    LIPID PROFILE        Cholesterol, total 167 <200 mg/dL    Triglyceride 65 <150 mg/dL    HDL Cholesterol 57 40 - 60 mg/dL    LDL, calculated 97 0 - 100 mg/dL    VLDL, calculated 13 mg/dL    CHOL/HDL Ratio 2.9 0 - 5.0     HEMOGLOBIN A1C WITH EAG    Collection Time: 05/12/22  4:15 AM   Result Value Ref Range    Hemoglobin A1c 5.7 (H) 4.2 - 5.6 %    Est. average glucose 117 mg/dL   CBC W/O DIFF    Collection Time: 05/12/22  4:15 AM   Result Value Ref Range    WBC 6.2 4.6 - 13.2 K/uL    RBC 5.74 (H) 4.35 - 5.65 M/uL    HGB 16.8 (H) 13.0 - 16.0 g/dL    HCT 51.7 (H) 36.0 - 48.0 %    MCV 90.1 78.0 - 100.0 FL    MCH 29.3 24.0 - 34.0 PG    MCHC 32.5 31.0 - 37.0 g/dL    RDW 13.8 11.6 - 14.5 %    PLATELET 096 124 - 422 K/uL    MPV 11.3 9.2 - 11.8 FL    NRBC 0.0 0.0  WBC    ABSOLUTE NRBC 0.00 0.00 - 0.01 K/uL   ECHO ADULT COMPLETE    Collection Time: 05/12/22  8:26 AM   Result Value Ref Range    LV EDV A2C 48 mL    LV EDV A4C 55 mL    LV ESV A2C 18 mL    LV ESV A4C 21 mL    IVSd 1.9 (A) 0.6 - 1.0 cm    LVIDd 4.3 4.2 - 5.9 cm    LVIDs 2.9 cm    LVOT Diameter 2.1 cm    LVOT Mean Gradient 2 mmHg    LVOT VTI 17.1 cm    LVOT Peak Velocity 1.0 m/s    LVOT Peak Gradient 4 mmHg    LVPWd 1.6 (A) 0.6 - 1.0 cm    LV E' Lateral Velocity 3 cm/s    LV E' Septal Velocity 2 cm/s    LV Ejection Fraction A2C 62 %    LV Ejection Fraction A4C 62 %    EF BP 62 55 - 100 %    LVOT Area 3.5 cm2    LVOT SV 59.2 ml    LA Minor Axis 5.3 cm    LA Major [place] 4.7 cm    LA Area 2C 18.5 cm2    LA Area 4C 16.9 cm2    LA Volume BP 52 18 - 58 mL    LA Diameter 3.8 cm    RA Area 4C 13.6 cm2    Est. RA Pressure 3 mmHg    AR .0 ms    AR Max Velocity PISA 5.7 m/s    AV Peak Velocity 1.1 m/s    AV Peak Gradient 5 mmHg    AV Mean Gradient 4 mmHg    AV VTI 22.8 cm    AV Mean Velocity 0.9 m/s    AV Area by VTI 2.6 cm2    AV Area by Peak Velocity 2.9 cm2    Aortic Root 3.5 cm    Ascending Aorta 3.7 cm    IVC Proxmal 1.6 cm    MV E Wave Deceleration Time 209.0 ms    MV A Velocity 0.99 m/s    MV E Velocity 0.56 m/s    MV Mean Gradient 3 mmHg    MV VTI 20.2 cm    MV Mean Velocity 0.8 m/s    MV Max Velocity 1.2 m/s    MV Peak Gradient 5 mmHg    MV Area by VTI 2.9 cm2    PV Max Velocity 0.8 m/s    PV Peak Gradient 3 mmHg    RV Basal Dimension 3.2 cm    RV Longitudinal Dimension 5.7 cm    RV Mid Dimension 2.0 cm    TAPSE 1.9 1.7 cm    TR Max Velocity 2.24 m/s    TR Peak Gradient 20 mmHg    Fractional Shortening 2D 33 28 - 44 %    LV ESV Index A4C 11 mL/m2    LV EDV Index A4C 29 mL/m2    LV ESV Index A2C 9 mL/m2    LV EDV Index A2C 25 mL/m2    LVIDd Index 2.24 cm/m2    LVIDs Index 1.51 cm/m2 LV RWT Ratio 0.74     LV Mass 2D 329.3 (A) 88 - 224 g    LV Mass 2D Index 171.5 (A) 49 - 115 g/m2    MV E/A 0.57     E/E' Ratio (Averaged) 23.33     E/E' Lateral 18.67     E/E' Septal 28.00     LA Volume Index BP 27 16 - 34 ml/m2    LVOT Stroke Volume Index 30.8 mL/m2    LA Size Index 1.98 cm/m2    LA/AO Root Ratio 1.09     Ao Root Index 1.82 cm/m2    Ascending Aorta Index 1.93 cm/m2    AV Velocity Ratio 0.91     LVOT:AV VTI Index 0.75     CORRY/BSA VTI 1.4 cm2/m2    CORRY/BSA Peak Velocity 1.5 cm2/m2    MV:LVOT VTI Index 1.18     RVSP 23 mmHg       Current Nutrition Intake & Therapies:  Average Meal Intake: %  Average Supplement Intake: None ordered  ADULT DIET Regular; Low Fat/Low Chol/High Fiber/2 gm Na; Low Sodium (2 gm)    Anthropometric Measures:  Height: 5' 10\" (177.8 cm)  Ideal Body Weight (IBW): 166 lbs (75 kg)  Admission Body Weight: 165 lb  Current Body Wt:  74.8 kg (165 lb), 99.4 % IBW. Standing scale  Current BMI (kg/m2): 23.7                          BMI Category: Normal weight (BMI 18.5-24. 9)    Estimated Daily Nutrient Needs:  Energy Requirements Based On: Kcal/kg (25-30 kcal/kg)  Weight Used for Energy Requirements: Admission (75 kg)  Energy (kcal/day): 6850-9037 kcal/day  Weight Used for Protein Requirements: Admission (0.8-1 g/kg)  Protein (g/day): 60-75 g/day  Method Used for Fluid Requirements: 1 ml/kcal  Fluid (ml/day): 8699-3863 mL/day    Nutrition Diagnosis:   · Food & nutrition-related knowledge deficit related to cardiac dysfunction as evidenced by other (specify) (CVA)      Nutrition Interventions:   Food and/or Nutrient Delivery: Continue current diet  Nutrition Education/Counseling: Education initiated,Education completed  Coordination of Nutrition Care: Continue to monitor while inpatient       Goals:     Goals: by next RD assessment,Meet at least 75% of estimated needs       Nutrition Monitoring and Evaluation:      Food/Nutrient Intake Outcomes: Food and nutrient intake  Physical Signs/Symptoms Outcomes: Meal time behavior,Weight,Nutrition focused physical findings,Biochemical data     F/u: 5/17/2022    Discharge Planning:    Continue current diet    [address]: [place] [phone]

## 2022-05-12 NOTE — PROGRESS NOTES
0730:Report received Manjit Kelly patient awake verbalize needs known no s/sx of distress noted    0900: patient took medication with no difficulty alert and oriented x4 verbalize needs known. Lung sounds Rhonchi and diminshed in the bases.  Denies SOB, all extremity able to use full extension    1100 PT seen patient had some coughing was able to clear his through per PT no straws    1300 patient resting with eyes closed easily aroused    1500 patient up ambulating in room with steady gait    1700 patient to have MRI 5/13/2022

## 2022-05-12 NOTE — PROGRESS NOTES
SPEECH LANGUAGE PATHOLOGY BEDSIDE SWALLOW EVALUATION    Patient: Mohan Winston (43 y.o. male)  Date: 5/12/2022  Primary Diagnosis: Stroke [facility]) [I63.9]        Precautions: aspiration      PLOF: Patient denies weakness, swallowing difficulty or slurred speech prior to current illness/hospitalization    ASSESSMENT :  Based on the objective data described below, the patient presents with oropharyngeal dysphagia characterized by left sided facial weakness, strong cough with serial swallows of thin liquid via straw. Patient with decreased labial ROM but adequate lingual and mandible ROM. Patient tolerates solids with minimal oral residue. Patient instructed on compensatory strategies for increased airway protection and decreased s/sx of aspiration. Patient demonstrated understanding of compensatory strategies. ST required to ensure safety with prescribed diet absent of s/sx of aspiration. Patient will benefit from skilled intervention to address the above impairments. Patient's rehabilitation potential is considered to be Good  Factors which may influence rehabilitation potential include:   []            None noted  [x]            Mental ability/status  [x]            Medical condition  []            Home/family situation and support systems  []            Safety awareness  []            Pain tolerance/management  []            Other:      PLAN :  Recommendations and Planned Interventions:  ST will monitor for diet safety, instruction on laryngeal strengthening tasks and compensatory strategy training for decreased aspiration risk  Frequency/Duration: Patient will be followed by speech-language pathology 2 times a week to address goals. Discharge Recommendations: To Be Determined     SUBJECTIVE:   Patient stated no when ST asked if he has had trouble swallowing.     OBJECTIVE:     Past Medical History:   Diagnosis Date    Hypertension      Past Surgical History:   Procedure Laterality Date    HX HEART CATHETERIZATION      x1 stent     Home Situation:   Home Situation  Home Environment: Private residence  # Steps to Enter: 3  One/Two Story Residence: One story  Living Alone: No  Support Systems: Spouse/Significant Other  Patient Expects to be Discharged to[de-identified] Home  Current DME Used/Available at Home: None    Diet prior to admission: regular/thin  Current Diet: regular/thin    Cognitive and Communication Status:  Neurologic State: Alert  Orientation Level: Oriented X4  Cognition: Follows commands           Oral Assessment:  Oral Assessment  Labial: Left droop  Dentition: Intact  Lingual: No impairment  Velum: No impairment  Mandible: No impairment  P.O. Trials:     Vocal quality prior to P.O.: No impairment  Consistency Presented: Thin liquid; Solid  How Presented: SLP-fed/presented;Straw     Bolus Acceptance: No impairment  Bolus Formation/Control: No impairment     Propulsion: No impairment  Oral Residue: None  Initiation of Swallow: No impairment  Laryngeal Elevation: Functional  Aspiration Signs/Symptoms: Strong cough     Effective Modifications: Small sips and bites  Cues for Modifications: Minimal               PAIN:  Pain level pre-treatment: 0/10   Pain level post-treatment: 0 /10   Pain Intervention(s): N/A  Response to intervention: N/A    After treatment:   []            Patient left in no apparent distress sitting up in chair  [x]            Patient left in no apparent distress in bed  []            Call bell left within reach  [x]            Nursing notified  []            Family present  []            Caregiver present  []            Bed alarm activated    COMMUNICATION/EDUCATION:   [x]            Aspiration precautions; swallow safety; compensatory techniques. []            Patient/family have participated as able in goal setting and plan of care. []            Patient/family agree to work toward stated goals and plan of care.   []            Patient understands intent and goals of therapy; neutral about participation. []            Patient unable to participate in goal setting/plan of care; educ ongoing with interdisciplinary staff  []         Posted safety precautions in patient's room. Thank you for this referral,  Sirena Tubbs MA, CCC-SLP    Time Calculation: 25 mins     Problem: Dysphagia (Adult)  Goal: *Acute Goals and Plan of Care (Insert Text)  Description: 1. Patient will comply with modifications for swallowing to decrease s/sx of aspiration  2.  Patient will tolerate current diet without s/sx of aspiration    Outcome: Progressing Towards Goal

## 2022-05-12 NOTE — PROGRESS NOTES
Problem: Mobility Impaired (Adult and Pediatric)  Goal: *Acute Goals and Plan of Care (Insert Text)  Description: Physical Therapy Goals  Initiated 5/12/2022 and to be accomplished within 7 day(s)  1. Patient will move from supine to sit and sit to supine , scoot up and down, and roll side to side in bed with independence. 2.  Patient will transfer from bed to chair and chair to bed with independence using the least restrictive device. 3.  Patient will perform sit to stand with independence. 4.  Patient will ambulate with independence for 300 feet with the least restrictive device. 5.  Patient will ascend/descend 4 stairs with 2 handrail(s) with modified independence. PLOF: Community ambulator, no AD, (I) ADLs. Outcome: Progressing Towards Goal   PHYSICAL THERAPY EVALUATION    Patient: Aren Oro (72 y.o. male)  Date: 5/12/2022  Primary Diagnosis: Stroke [facility]) [I63.9]        Precautions: N/A       ASSESSMENT :  Based on the objective data described below, the patient presents with CVA with L facial droop and feeling of heaviness in his L hemibody. Patient would benefit from further P.T. to improve strength, gait, and stair navigation. They would likely benefit from outpatient P.T. once medically stable. Patient will benefit from skilled intervention to address the above impairments.   Patient's rehabilitation potential is considered to be Excellent  Factors which may influence rehabilitation potential include:   []         None noted  []         Mental ability/status  [x]         Medical condition  []         Home/family situation and support systems  []         Safety awareness  []         Pain tolerance/management  []         Other:      PLAN :  Recommendations and Planned Interventions:   [x]           Bed Mobility Training             []    Neuromuscular Re-Education  [x]           Transfer Training                   []    Orthotic/Prosthetic Training  [x]           Gait Training []    Modalities  [x]           Therapeutic Exercises           []    Edema Management/Control  [x]           Therapeutic Activities            []    Family Training/Education  [x]           Patient Education  []           Other (comment):    Frequency/Duration: Patient will be followed by physical therapy 1-2 times per day/4-7 days per week to address goals. Discharge Recommendations: Outpatient  Further Equipment Recommendations for Discharge: N/A     SUBJECTIVE:   Patient stated my L arm and leg just feel heavy.     OBJECTIVE DATA SUMMARY:     Past Medical History:   Diagnosis Date    Hypertension      Past Surgical History:   Procedure Laterality Date    HX HEART CATHETERIZATION      x1 stent     Barriers to Learning/Limitations: None  Compensate with: N/A  Home Situation:  Home Situation  Home Environment: Private residence  # Steps to Enter: 3  One/Two Story Residence: One story  Living Alone: No  Support Systems: Spouse/Significant Other  Patient Expects to be Discharged to[de-identified] Home with outpatient services  Current DME Used/Available at Home: None  Critical Behavior:  Neurologic State: Alert  Orientation Level: Oriented X4  Cognition: Appropriate decision making     Strength:    Strength: Generally decreased, functional  RUE: 5/5  LUE: 4+/5  RLE: 5/5  LLE: 4+/5  Tone & Sensation:   Tone: Normal  Sensation: Intact  Range Of Motion:   AROM: Within functional limits  PROM: Within functional limits  Posture:  Posture (WDL): Within defined limits     Functional Mobility:  Bed Mobility:  Rolling: Independent  Supine to Sit: Independent  Sit to Supine: Independent  Scooting: Independent  Transfers:  Sit to Stand: Stand-by assistance  Stand to Sit: Stand-by assistance     Balance:   Sitting: Intact  Standing: Intact  Ambulation/Gait Training:  Distance (ft): 120 Feet (ft)  Assistive Device: Other (comment) (no AD)  Ambulation - Level of Assistance: Stand-by assistance     Gait Description (WDL): Exceptions to WDL  Gait Abnormalities: Other (States L hemibody feels heavy during gait)  Pain:  Pain level pre-treatment: 0/10   Pain level post-treatment: 0/10   Pain Location: N/A  Activity Tolerance:   Good  Please refer to the flowsheet for vital signs taken during this treatment. After treatment:   []         Patient left in no apparent distress sitting up in chair  [x]         Patient left in no apparent distress in bed  [x]         Call bell left within reach  [x]         Nursing notified  []         Caregiver present  []         Bed alarm activated  []         SCDs applied    COMMUNICATION/EDUCATION:   [x]         Role of Physical Therapy in the acute care setting. [x]         Fall prevention education was provided and the patient/caregiver indicated understanding. [x]         Patient/family have participated as able in goal setting and plan of care. [x]         Patient/family agree to work toward stated goals and plan of care. []         Patient understands intent and goals of therapy, but is neutral about his/her participation. []         Patient is unable to participate in goal setting/plan of care: ongoing with therapy staff.  []         Other:     Thank you for this referral.  Amanda Meza, PT, DPT   Time Calculation: 15 mins

## 2022-05-12 NOTE — PROGRESS NOTES
OCCUPATIONAL THERAPY EVALUATION/DISCHARGE    Patient: Ban Butcher (72 y.o. male)  Date: 5/12/2022  Primary Diagnosis: Stroke [facility]) [I63.9]       Precautions: none      PLOF: lives at home with wife and had been I with all ADLs and mobility    ASSESSMENT AND RECOMMENDATIONS:  Based on the objective data described below, the patient presents with declines in ADLs and mobility. Skilled occupational therapy is not indicated at this time. Discharge Recommendations: To Be Determined  Further Equipment Recommendations for Discharge: N/A      SUBJECTIVE:   Patient stated I aint doing too good.     OBJECTIVE DATA SUMMARY:     Past Medical History:   Diagnosis Date    Hypertension      Past Surgical History:   Procedure Laterality Date    HX HEART CATHETERIZATION      x1 stent     Barriers to Learning/Limitations: None  Compensate with: visual, verbal, tactile, kinesthetic cues/model    Home Situation:   Home Situation  Home Environment: Private residence  # Steps to Enter: 3  One/Two Story Residence: One story  Living Alone: No  Support Systems: Spouse/Significant Other  Patient Expects to be Discharged to[de-identified] Home  Current DME Used/Available at Home: None  [x]     Right hand dominant   []     Left hand dominant    Cognitive/Behavioral Status:  Neurologic State: Alert  Orientation Level: Oriented X4  Cognition: Follows commands       Skin: intact   Edema:none noted     Vision/Perceptual:        WFLs                               Coordination: BUE     WFLs             Balance:    good in seated and standing     Strength: BUE  4/5 B UE strength                  Tone & Sensation: BUE  Normal; good gross and fine motor coordination                           Range of Motion: BUE  Full                             Functional Mobility and Transfers for ADLs:  Bed Mobility:    I            Transfers: I                                     ADL Assessment:    pt seen in unsupported sitting with legs crossed with no LOB. Demonstrates good gross and fine motor coordination. All digit opposition            ADL Intervention:    no skilled OT intervention indicated       Pain:  Pain level pre-treatment: 0/10   Pain level post-treatment: 0/10   Pain Intervention(s): Medication (see MAR); Rest, Ice, Repositioning   Response to intervention: Nurse notified, See doc flow    Activity Tolerance:   Good     Please refer to the flowsheet for vital signs taken during this treatment. After treatment:   [x]  Patient left in no apparent distress sitting up in chair  []  Patient left in no apparent distress in bed  [x]  Call bell left within reach  [x]  Nursing notified  []  Caregiver present  []  Bed alarm activated    COMMUNICATION/EDUCATION:   [x]      Role of Occupational Therapy in the acute care setting  [x]      Home safety education was provided and the patient/caregiver indicated understanding. []      Patient/family have participated as able and agree with findings and recommendations. []      Patient is unable to participate in plan of care at this time. Thank you for this referral.  Tiffanie Cortés MS, OTR/L          Eval Complexity: History: Low ;   Examination: LOW Complexity : 1-3 performance deficits relating to physical, cognitive , or psychosocial skils that result in activity limitations and / or participation restrictions ;    Decision Making:LOW Complexity : No comorbidities that affect functional and no verbal or physical assistance needed to complete eval tasks

## 2022-05-12 NOTE — PROGRESS NOTES
Admission Medication Reconciliation:    Information obtained from:  patient    Comments/Recommendations: Reviewed PTA medications and patient's allergies. Verified with patient that he does not take any medications at home. Allergies:  Patient has no known allergies.     Significant PMH/Disease States:   Past Medical History:   Diagnosis Date    Hypertension      Chief Complaint for this Admission:    Chief Complaint   Patient presents with    Facial Droop    Extremity Weakness     Prior to Admission Medications:   None       RAJI Lockett

## 2022-05-12 NOTE — PROGRESS NOTES
Hospitalist Progress Note    Daily Progress Note: 2022 2:14 PM      Roselia Denton                                            MRN: [id number]                                  :1960      Subjective:     Pt examined and seen at bedside. Is alert and oriented x3, there are no acute signs or symptoms of distress. Patient continues to have left-sided facial droop, and slurred speech which is improving. Patient denies fever, chills, shortness of breath, chest pain, he reports that his left-sided weakness has improved, but he still feels that it feels a little heavy. No acute events reported overnight. Plans to continue to monitor patient neuro checks every 4 hours, monitor blood pressure closely, medicate with IV antihypertensives for systolic greater than 500. Patient is scheduled for MRI in the a.m. Objective:     Visit Vitals  BP (!) 173/92   Pulse (!) 101   Temp 97.4 °F (36.3 °C)   Resp 18   Ht 5' 10\" (1.778 m)   Wt 74.8 kg (165 lb)   SpO2 96%   BMI 23.68 kg/m²      O2 Device: None (Room air)    Temp (24hrs), Av.8 °F (36.6 °C), Min:97.4 °F (36.3 °C), Max:98 °F (36.7 °C)      No intake/output data recorded. No intake/output data recorded. PHYSICAL EXAM:  Physical Exam  Vitals and nursing note reviewed. Constitutional:       Appearance: Normal appearance. He is normal weight. HENT:      Head: Normocephalic and atraumatic. Mouth/Throat:      Mouth: Mucous membranes are moist.   Eyes:      Extraocular Movements: Extraocular movements intact. Pupils: Pupils are equal, round, and reactive to light. Cardiovascular:      Rate and Rhythm: Normal rate and regular rhythm. Pulses: Normal pulses. Heart sounds: Normal heart sounds. Pulmonary:      Effort: Pulmonary effort is normal.      Breath sounds: Normal breath sounds. Abdominal:      General: Abdomen is flat. Bowel sounds are normal.      Palpations: Abdomen is soft.    Musculoskeletal:      Cervical back: Normal range of motion and neck supple. Skin:     General: Skin is warm and dry. Capillary Refill: Capillary refill takes less than 2 seconds. Neurological:      General: No focal deficit present. Mental Status: He is alert and oriented to person, place, and time, left side facial droop, slurred speech improving . Psychiatric:         Mood and Affect: Mood normal.     Current Facility-Administered Medications   Medication Dose Route Frequency    aspirin chewable tablet 81 mg  81 mg Oral DAILY    atorvastatin (LIPITOR) tablet 80 mg  80 mg Oral QHS    acetaminophen (TYLENOL) tablet 650 mg  650 mg Oral Q4H PRN    ondansetron (ZOFRAN ODT) tablet 4 mg  4 mg Oral Q8H PRN    nicotine (NICODERM CQ) 14 mg/24 hr patch 1 Patch  1 Patch TransDERmal DAILY    enoxaparin (LOVENOX) injection 40 mg  40 mg SubCUTAneous Q24H    hydrALAZINE (APRESOLINE) 20 mg/mL injection 10 mg  10 mg IntraVENous Q6H PRN    labetaloL (NORMODYNE;TRANDATE) 20 mg/4 mL (5 mg/mL) injection 10 mg  10 mg IntraVENous Q6H PRN        Assessment/Plan:     CVA  -patient continue with left side facial droop, speech is improving, patient  to the his left and lower extremities are improving  -CT Head: no acuate findings, was consistent with chronic findings. CTA brain/Neck shown: 57% diameter stenosis proximal left ICA, less than 30% proximal right ICAModerate to severe atherosclerotic stenosis carotid siphons right greater than left and bilateral posterior cerebral arteries, Bilateral basal ganglia and thalamic likely chronic lacunar infarcts. Small anterior right frontal cortical infarct age not definitely determined, No evidence of intracranial large vessel occlusion  -MRI in the am  -ECHO:EF by 2D Simpsons Biplane is 62%. Left ventricle size is normalNo interatrial shunt visualized with color Doppler.  Agitated saline study was negative with and without provocation  -continue neurological checks every 4 hours  -continue to allow permissive hypertension, treat for a systolic greater than 030     Hypertension  -chronic, continuing to allow permissive hypertension, treating for a systolic greater than 785 with PRN Hydralazine and Labetalol for a systolic greater than 585/183  -monitor BP closely     History of CAD with LAD stent  -continue ASA, start statin     Hyperlipidemia  -lipid panel cholesterol 167, HDL 57, and LDL 97  -continue high dose of Lipitor     Tobacco abuse  -nicotine patch ordered  -smoking cessation education    DVT Prophylaxis: Lovenox    Code Status: Full    Care Plan discussed with: Patient     Clinical time 25 minutes with >50% of visit spent in counseling and coordination of care    Signed by: YASIR Valenzuela 5/12/2022

## 2022-05-13 ENCOUNTER — APPOINTMENT (OUTPATIENT)
Dept: MRI IMAGING | Age: 62
DRG: 065 | End: 2022-05-13
Attending: NURSE PRACTITIONER

## 2022-05-13 VITALS
HEIGHT: 70 IN | DIASTOLIC BLOOD PRESSURE: 89 MMHG | HEART RATE: 89 BPM | RESPIRATION RATE: 18 BRPM | WEIGHT: 165 LBS | OXYGEN SATURATION: 97 % | TEMPERATURE: 98 F | BODY MASS INDEX: 23.62 KG/M2 | SYSTOLIC BLOOD PRESSURE: 144 MMHG

## 2022-05-13 LAB
ANION GAP SERPL CALC-SCNC: 4 MMOL/L (ref 3–18)
BUN SERPL-MCNC: 20 MG/DL (ref 7–18)
BUN/CREAT SERPL: 19 (ref 12–20)
CA-I BLD-MCNC: 9 MG/DL (ref 8.5–10.1)
CHLORIDE SERPL-SCNC: 104 MMOL/L (ref 100–111)
CO2 SERPL-SCNC: 29 MMOL/L (ref 21–32)
CREAT SERPL-MCNC: 1.05 MG/DL (ref 0.6–1.3)
ERYTHROCYTE [DISTWIDTH] IN BLOOD BY AUTOMATED COUNT: 13.9 % (ref 11.6–14.5)
GLUCOSE SERPL-MCNC: 103 MG/DL (ref 74–99)
HCT VFR BLD AUTO: 50.9 % (ref 36–48)
HGB BLD-MCNC: 16.5 G/DL (ref 13–16)
MCH RBC QN AUTO: 29.1 PG (ref 24–34)
MCHC RBC AUTO-ENTMCNC: 32.4 G/DL (ref 31–37)
MCV RBC AUTO: 89.8 FL (ref 78–100)
NRBC # BLD: 0 K/UL (ref 0–0.01)
NRBC BLD-RTO: 0 PER 100 WBC
PLATELET # BLD AUTO: 207 K/UL (ref 135–420)
PMV BLD AUTO: 10.6 FL (ref 9.2–11.8)
POTASSIUM SERPL-SCNC: 4.2 MMOL/L (ref 3.5–5.5)
RBC # BLD AUTO: 5.67 M/UL (ref 4.35–5.65)
SODIUM SERPL-SCNC: 137 MMOL/L (ref 136–145)
WBC # BLD AUTO: 4.6 K/UL (ref 4.6–13.2)

## 2022-05-13 PROCEDURE — 85027 COMPLETE CBC AUTOMATED: CPT

## 2022-05-13 PROCEDURE — 74011250636 HC RX REV CODE- 250/636: Performed by: NURSE PRACTITIONER

## 2022-05-13 PROCEDURE — 74011250636 HC RX REV CODE- 250/636: Performed by: INTERNAL MEDICINE

## 2022-05-13 PROCEDURE — 36415 COLL VENOUS BLD VENIPUNCTURE: CPT

## 2022-05-13 PROCEDURE — A9576 INJ PROHANCE MULTIPACK: HCPCS | Performed by: INTERNAL MEDICINE

## 2022-05-13 PROCEDURE — 80048 BASIC METABOLIC PNL TOTAL CA: CPT

## 2022-05-13 PROCEDURE — 70553 MRI BRAIN STEM W/O & W/DYE: CPT

## 2022-05-13 PROCEDURE — 74011250637 HC RX REV CODE- 250/637: Performed by: NURSE PRACTITIONER

## 2022-05-13 RX ORDER — GUAIFENESIN 100 MG/5ML
81 LIQUID (ML) ORAL DAILY
Qty: 30 TABLET | Refills: 0 | Status: SHIPPED | OUTPATIENT
Start: 2022-05-14

## 2022-05-13 RX ORDER — CLOPIDOGREL BISULFATE 75 MG/1
75 TABLET ORAL DAILY
Qty: 20 TABLET | Refills: 0 | Status: SHIPPED | OUTPATIENT
Start: 2022-05-13 | End: 2022-06-02

## 2022-05-13 RX ORDER — LISINOPRIL 20 MG/1
40 TABLET ORAL DAILY
Status: DISCONTINUED | OUTPATIENT
Start: 2022-05-13 | End: 2022-05-13 | Stop reason: HOSPADM

## 2022-05-13 RX ORDER — ATORVASTATIN CALCIUM 80 MG/1
80 TABLET, FILM COATED ORAL
Qty: 30 TABLET | Refills: 0 | Status: SHIPPED | OUTPATIENT
Start: 2022-05-13

## 2022-05-13 RX ORDER — CLOPIDOGREL BISULFATE 75 MG/1
75 TABLET ORAL DAILY
Status: DISCONTINUED | OUTPATIENT
Start: 2022-05-13 | End: 2022-05-13 | Stop reason: HOSPADM

## 2022-05-13 RX ORDER — LISINOPRIL 40 MG/1
40 TABLET ORAL DAILY
Qty: 30 TABLET | Refills: 0 | Status: SHIPPED | OUTPATIENT
Start: 2022-05-14

## 2022-05-13 RX ADMIN — LISINOPRIL 40 MG: 20 TABLET ORAL at 11:12

## 2022-05-13 RX ADMIN — HYDRALAZINE HYDROCHLORIDE 10 MG: 20 INJECTION INTRAMUSCULAR; INTRAVENOUS at 04:41

## 2022-05-13 RX ADMIN — GADOTERIDOL 15 ML: 279.3 INJECTION, SOLUTION INTRAVENOUS at 08:54

## 2022-05-13 RX ADMIN — CLOPIDOGREL BISULFATE 75 MG: 75 TABLET ORAL at 12:27

## 2022-05-13 RX ADMIN — ENOXAPARIN SODIUM 40 MG: 100 INJECTION SUBCUTANEOUS at 00:24

## 2022-05-13 RX ADMIN — ASPIRIN 81 MG: 81 TABLET, CHEWABLE ORAL at 08:14

## 2022-05-13 RX ADMIN — HYDRALAZINE HYDROCHLORIDE 10 MG: 20 INJECTION INTRAMUSCULAR; INTRAVENOUS at 11:06

## 2022-05-13 NOTE — DISCHARGE SUMMARY
Hospitalist Discharge Summary     Patient ID:    Marisol Sears  [id number]  71 y.o.  1960    Admit date: 5/11/2022    Discharge date : 5/13/2022    Chronic Diagnoses:    Problem List as of 5/13/2022 Never Reviewed          Codes Class Noted - Resolved    * (Principal) Stroke (cerebrum) ([facility] ICD-10-CM: I63.9  ICD-9-CM: 434.91  5/11/2022 - Present        Tobacco abuse ICD-10-CM: Z72.0  ICD-9-CM: 305.1  5/11/2022 - Present        Hypertension ICD-10-CM: I10  ICD-9-CM: 401.9  5/11/2022 - Present          22    Final Diagnoses:   Principal Problem:    Stroke (cerebrum) ([facility] (5/11/2022)    Active Problems:    Tobacco abuse (5/11/2022)      Hypertension (5/11/2022)        Reason for Hospitalization:    Marisol Sears is a 63 y.o. with a past medical history for hypertension, coronary artery disease with a LAD stent, dyslipidemia, and tobacco abuse, patient presented to the ED with a chief complaint of left-sided weakness and slurred speech. Patient admitted for stroke workup and MRI was found to be positive for a   Acute right basal ganglia small vessel lacunar infarction involving the right  corona radiata and posterior limb internal capsule at the lateral margin of the  Thalamus. CTA brain/Neck shown: 57% diameter stenosis proximal left ICA, less than 30% proximal right ICAModerate to severe atherosclerotic stenosis carotid siphons right greater than left and bilateral posterior cerebral arteriesOn the day of discharge patient continues to have mild left side weakness and left side facial droop, slurred speech has improved. PT/OT/ST consulted and physical therapist is recommending outpatient physical therapy. Patient stable for discharge.     CVA  -most likely secondary to uncontrolled hypertension and tobacco use  -MRI: Acute right basal ganglia small vessel lacunar infarction involving the right  corona radiata and posterior limb internal capsule at the lateral margin of the  Thalamus. -CT Head: no acuate findings, was consistent with chronic findings. CTA brain/Neck shown: 57% diameter stenosis proximal left ICA, less than 30% proximal right ICAModerate to severe atherosclerotic stenosis carotid siphons right greater than left and bilateral posterior cerebral arteries, Bilateral basal ganglia and thalamic likely chronic lacunar infarcts. Small anterior right frontal cortical infarct age not definitely determined, No evidence of intracranial large vessel occlusion  -ECHO:EF by 2D Simpsons Biplane is 62%. Left ventricle size is normalNo interatrial shunt visualized with color Doppler. Agitated saline study was negative with and without provocation  -started Lipitor 80 mg daily, ASA 81 mg daily, Plavix 74 mg daily for 21 days, and Lisinopril for Hypertension  -reinforce the importance of smoking cessation  -Patient advised to follow-up with his PCP in 1-2 weeks, also recommended to ask for a vascular surgeon referral for carotid stenosis. Hypertension  -chronic, improving  -started Lisinopril 40 mg daily     History of CAD with LAD stent  -continue ASA, started statin (Lipitor 80 mg daily)     Hyperlipidemia  -lipid panel cholesterol 167, HDL 57, and LDL 97  -continue high dose of Lipitor     Tobacco abuse  -reinforce the importance of smoking cessation    Discharge Medications:   Current Discharge Medication List      START taking these medications    Details   aspirin 81 mg chewable tablet Take 1 Tablet by mouth daily. Qty: 30 Tablet, Refills: 0  Start date: 5/14/2022      atorvastatin (LIPITOR) 80 mg tablet Take 1 Tablet by mouth nightly. Qty: 30 Tablet, Refills: 0  Start date: 5/13/2022      clopidogreL (PLAVIX) 75 mg tab Take 1 Tablet by mouth daily for 20 days. Qty: 20 Tablet, Refills: 0  Start date: 5/13/2022, End date: 6/2/2022      lisinopriL (PRINIVIL, ZESTRIL) 40 mg tablet Take 1 Tablet by mouth daily.   Qty: 30 Tablet, Refills: 0  Start date: 5/14/2022 Follow up Care:    1. Patient advised to follow-up with his PCP in 1-2 weeks, also recommended to ask for a vascular surgeon referral for carotid stenosis. Follow-up Information     Follow up With Specialties Details Why Contact Info    None    None ([phone]) Patient stated that they have no PCP          Patient Follow Up Instructions: Activity: Activity as tolerated and no driving for today  Diet:  Cardiac Diet    Condition at Discharge:  Stable  __________________________________________________________________    Disposition  Home or Self Care  ____________________________________________________________________    Code Status:  Full Code  ___________________________________________________________________    Discharge Exam:  Patient seen and examined by me on discharge day. Pertinent Findings:  Gen:    Not in distress  Chest: Clear lungs  CVS:   Regular rhythm.   No edema  Abd:  Soft, not distended, not tender  Neuro:  Alert    CONSULTATIONS: Neurology    Significant Diagnostic Studies:   Recent Results (from the past 24 hour(s))   CBC W/O DIFF    Collection Time: 05/13/22  9:47 AM   Result Value Ref Range    WBC 4.6 4.6 - 13.2 K/uL    RBC 5.67 (H) 4.35 - 5.65 M/uL    HGB 16.5 (H) 13.0 - 16.0 g/dL    HCT 50.9 (H) 36.0 - 48.0 %    MCV 89.8 78.0 - 100.0 FL    MCH 29.1 24.0 - 34.0 PG    MCHC 32.4 31.0 - 37.0 g/dL    RDW 13.9 11.6 - 14.5 %    PLATELET 582 207 - 662 K/uL    MPV 10.6 9.2 - 11.8 FL    NRBC 0.0 0.0  WBC    ABSOLUTE NRBC 0.00 0.00 - 9.29 K/uL   METABOLIC PANEL, BASIC    Collection Time: 05/13/22  9:47 AM   Result Value Ref Range    Sodium 137 136 - 145 mmol/L    Potassium 4.2 3.5 - 5.5 mmol/L    Chloride 104 100 - 111 mmol/L    CO2 29 21 - 32 mmol/L    Anion gap 4 3.0 - 18.0 mmol/L    Glucose 103 (H) 74 - 99 mg/dL    BUN 20 (H) 7 - 18 mg/dL    Creatinine 1.05 0.60 - 1.30 mg/dL    BUN/Creatinine ratio 19 12 - 20      GFR est AA >60 >60 ml/min/1.73m2    GFR est non-AA >60 >60 ml/min/1.73m2 Calcium 9.0 8.5 - 10.1 mg/dL     MRI BRAIN W WO CONT   Final Result      1. Acute right basal ganglia small vessel lacunar infarction involving the right   corona radiata and posterior limb internal capsule at the lateral margin of the   thalamus. No hemorrhage or mass effect. 2. Extensive additional bilateral basal ganglia and thalamic abnormality   consistent with a combination of perivascular CSF spaces, chronic lacunar   infarction and localized ischemic change. Multiple clearly chronic areas of   lacunar infarction in the basal ganglia and thalami. 3. White matter abnormality is nonspecific but likely is ischemic and in part   includes chronic deep white matter infarction. 4. Tiny chronic high right frontal cortical infarction. 5. Chronic pontine lacunar small vessel infarctions and small areas of chronic   infarction in the cerebellar hemispheres. CTA HEAD NECK W CONT   Final Result      1. 57% diameter stenosis proximal left ICA, less than 30% proximal right ICA by   NASCET criteria. 2. No significant vertebral or basilar stenosis. 3. No evidence of intracranial large vessel occlusion. 4. Moderate to severe atherosclerotic stenosis carotid siphons right greater   than left and bilateral posterior cerebral arteries. 5. Bilateral basal ganglia and thalamic likely chronic lacunar infarcts. Small   anterior right frontal cortical infarct age not definitely determined. These results were sent at the time of the exam to the referring physician by   the [facility] on call radiologist.      XR CHEST PORT   Final Result      No acute findings in the chest.       CT HEAD WO CONT   Final Result         1. No acute intracranial abnormality. 2. Subcortical and periventricular white matter low-attenuation suggesting   chronic ischemic microvascular change along with stigmata of several prior areas   of infarction.         Total Time Spent: 25 minutes    Signed:  [employer] Iven Aschoff, [facility]  5/13/2022  11:49 AM

## 2022-05-13 NOTE — PROGRESS NOTES
0030 - Lovenox administered in abdomen. Patient alert and oriented x4. Left sided weakness present and left facial droop present. Call light in reach.

## 2022-05-13 NOTE — PROGRESS NOTES
Care Management Interventions  PCP Verified by CM: No (Pt will need to be assigned a PCP prior to discharge. )  Palliative Care Criteria Met (RRAT>21 & CHF Dx)?: No  Mode of Transport at Discharge: Other (see comment) (Family)  Transition of Care Consult (CM Consult): Discharge Planning  MyChart Signup: No  Discharge Durable Medical Equipment: No  Health Maintenance Reviewed: Yes  Physical Therapy Consult: Yes  Occupational Therapy Consult: Yes  Speech Therapy Consult: Yes  Support Systems: Spouse/Significant Other  Confirm Follow Up Transport: Family  The Patient and/or Patient Representative was Provided with a Choice of Provider and Agrees with the Discharge Plan?: Yes  Freedom of Choice List was Provided with Basic Dialogue that Supports the Patient's Individualized Plan of Care/Goals, Treatment Preferences and Shares the Quality Data Associated with the Providers?: Yes  Discharge Location  Patient Expects to be Discharged to[de-identified] Home   Reason for Admission: Chart reviewed and noted patient presents with C/O left-sided weakness and slurred speech. Pt's wife noticed his slurred speech had worsened. He was also having ataxia and left-sided facial droop. Dx: Stroke     PMH: HTN, CAD, Dyslipidemia, Tobacco Abuse                       RUR Score: 9%                    Plan for utilizing home health: TBD         PCP: First and Last name:  None- Pt will need to be assigned a PCP prior to discharge.       Name of Practice:    Are you a current patient: Yes/No:    Approximate date of last visit:    Can you participate in a virtual visit with your PCP:                     Current Advanced Directive/Advance Care Plan: Prior- No ACP      Healthcare Decision Maker: Wife- Sweta Kimble- [phone]   Click here to [address] including selection of the Healthcare Decision Maker Relationship (ie \"Primary\")                             Transition of Care Plan:  Discharge planning is aimed at transition to home. Patient lives at home with his wife. Discharge planning with [employer] and Medication Reconciliation. Nurse will make follow-up appointments prior to discharge. Patient will be returning back home at discharge.

## 2022-05-13 NOTE — PROGRESS NOTES
0700- assumed care and received bedside shift report, alert and oriented, left facial droop noted with left sided weakness, iv right FA in place and intact, no distress, Tele on patient running SR with ST depression, call bell and urinal in reach. 0720- vitals taken, alert, facial droop left with left sided weakness, hand  stronger on right side, lung sounds clear. 6531- med given. 0830- in MRI - standby Tele. 2188- back from MRI - Tele on. No distress,  Daughter at bedside. 1105- vitals taken, neuro check done - weakness left with left facial droop, prn BP med given. Notified NP to discuss MRI results with patient and daughter. 1330- received D/C instructions, appointments to follow up , iv removed and tele box, taken down by wheelchair.

## 2022-05-13 NOTE — PROGRESS NOTES
Problem: Pain  Goal: *Control of Pain  Outcome: Progressing Towards Goal  Goal: *PALLIATIVE CARE:  Alleviation of Pain  Outcome: Progressing Towards Goal     Problem: Patient Education: Go to Patient Education Activity  Goal: Patient/Family Education  Outcome: Progressing Towards Goal     Problem: Patient Education: Go to Patient Education Activity  Goal: Patient/Family Education  Outcome: Progressing Towards Goal     Problem: Nutrition Deficit  Goal: *Optimize nutritional status  Outcome: Progressing Towards Goal     Problem: Patient Education: Go to Patient Education Activity  Goal: Patient/Family Education  Outcome: Progressing Towards Goal     Problem: Patient Education: Go to Patient Education Activity  Goal: Patient/Family Education  Outcome: Progressing Towards Goal     Problem: Falls - Risk of  Goal: *Absence of Falls  Description: Document Artem Cid Fall Risk and appropriate interventions in the flowsheet.   Outcome: Progressing Towards Goal  Note: Fall Risk Interventions:            Medication Interventions: Evaluate medications/consider consulting pharmacy,Teach patient to arise slowly                   Problem: Patient Education: Go to Patient Education Activity  Goal: Patient/Family Education  Outcome: Progressing Towards Goal no

## 2022-05-13 NOTE — PROGRESS NOTES
1850 - Shift change report received from [facility] 21    1350 - Family visiting at bedside. Vital signs assessed. Provider notified of 197/118 BP. Provider orders to reassess bp in 1 hour. Patient denies pain. Left side facial weakness and left upper extremity weakness and numbness continues. 2108 - Provider notified of manual repeat /100.    2110 - Scheduled medications administered. Patient refuses offer of snack. Ice water provided. Call light in reach. 2300 - Patient resting quietly. No signs or symptoms of distress. Call light in reach.

## 2022-05-13 NOTE — PROGRESS NOTES
Vital signs assessed. PRN hydralazine administered for PRN /117. Patient resting comfortably. Neuro checks performed. Call light in reach.

## 2022-07-08 ENCOUNTER — APPOINTMENT (OUTPATIENT)
Dept: PHYSICAL THERAPY | Age: 62
End: 2022-07-08

## 2022-07-08 ENCOUNTER — HOSPITAL ENCOUNTER (OUTPATIENT)
Dept: PHYSICAL THERAPY | Age: 62
End: 2022-07-08